# Patient Record
Sex: FEMALE
[De-identification: names, ages, dates, MRNs, and addresses within clinical notes are randomized per-mention and may not be internally consistent; named-entity substitution may affect disease eponyms.]

---

## 2018-06-18 PROBLEM — Z00.00 ENCOUNTER FOR PREVENTIVE HEALTH EXAMINATION: Status: ACTIVE | Noted: 2018-06-18

## 2018-07-12 ENCOUNTER — APPOINTMENT (OUTPATIENT)
Dept: INTERNAL MEDICINE | Facility: CLINIC | Age: 48
End: 2018-07-12

## 2024-12-01 ENCOUNTER — NON-APPOINTMENT (OUTPATIENT)
Age: 54
End: 2024-12-01

## 2024-12-03 ENCOUNTER — NON-APPOINTMENT (OUTPATIENT)
Age: 54
End: 2024-12-03

## 2025-03-25 ENCOUNTER — EMERGENCY (EMERGENCY)
Facility: HOSPITAL | Age: 55
LOS: 1 days | Discharge: HOME | End: 2025-03-25
Attending: EMERGENCY MEDICINE | Admitting: EMERGENCY MEDICINE
Payer: COMMERCIAL

## 2025-03-25 VITALS
DIASTOLIC BLOOD PRESSURE: 76 MMHG | TEMPERATURE: 99 F | OXYGEN SATURATION: 98 % | HEART RATE: 81 BPM | WEIGHT: 115.08 LBS | HEIGHT: 61 IN | SYSTOLIC BLOOD PRESSURE: 108 MMHG | RESPIRATION RATE: 18 BRPM

## 2025-03-25 RX ORDER — METHYLPREDNISOLONE ACETATE 80 MG/ML
1 INJECTION, SUSPENSION INTRA-ARTICULAR; INTRALESIONAL; INTRAMUSCULAR; SOFT TISSUE
Qty: 1 | Refills: 0
Start: 2025-03-25

## 2025-03-25 RX ORDER — ACETAMINOPHEN 500 MG/5ML
975 LIQUID (ML) ORAL ONCE
Refills: 0 | Status: COMPLETED | OUTPATIENT
Start: 2025-03-25 | End: 2025-03-25

## 2025-03-25 RX ORDER — IBUPROFEN 200 MG
400 TABLET ORAL ONCE
Refills: 0 | Status: COMPLETED | OUTPATIENT
Start: 2025-03-25 | End: 2025-03-25

## 2025-03-25 RX ADMIN — Medication 400 MILLIGRAM(S): at 13:29

## 2025-03-25 RX ADMIN — Medication 975 MILLIGRAM(S): at 13:29

## 2025-03-25 NOTE — ED ADULT NURSE NOTE - NS ED NURSE RECORD ANOTHER HT AND WT
Complications of Deep Vein Thrombosis   Deep vein thrombosis (DVT) is a condition involving the formation of a blood clot or thrombus in a deep vein. One may develop in a large vein deep inside the leg, arm, or other part of the body. Complications from deep vein thrombosis can be very serious. They can include pulmonary embolism (PE), chronic venous insufficiency, and post-thrombotic syndrome.    You may hear healthcare providers use the term venous thromboembolism (VTE) to describe DVT and PE. They use the term VTE because the two conditions are very closely related. And, because their prevention and treatment are closely related.   Pulmonary embolism   Pulmonary embolism (PE) happens when part of the clot, called an embolus, separates from the vein. It travels to the lungs and cuts off the flow of blood. A PE may develop quickly. It's a medical emergency and may cause death.          Call 911  Call 911 if you have symptoms of a blood clot in the lungs. Symptoms may include:   Chest pain  Trouble breathing or sudden shortness of breath  Coughing (may cough up blood)  Fainting  Fast heartbeat  Sweating  You may have bleeding if you take medicine to help prevent blood clots. Call 911 if you have heavy or uncontrolled bleeding.   When to call your healthcare provider  Call your healthcare provider if you have symptoms of a blood clot. The symptoms include:   Swelling  Pain  Redness in your leg, arm, or other area  Call your healthcare provider if you have signs or symptoms of bleeding, like blood in the urine, bleeding with bowel movements, or bleeding from the nose, gums, a cut, or vagina.   Chronic venous insufficiency and post-thrombotic syndrome   Two other complications of deep vein thrombosis are chronic venous insufficiency and post-thrombotic syndrome.   Chronic venous insufficiency  may happen following deep vein thrombosis of a leg vein. It means that a vein no longer works as well. It's a long-term  condition where blood stays in the vein instead of flowing back to the heart. Pain and swelling in the leg are common symptoms.   Post-thrombotic syndrome may also happen following deep vein thrombosis of a leg vein. It's a long-term problem with pain, swelling, and redness. Ulcers and sores can also happen if the condition is not treated early. These complications and associated symptoms may make it hard to walk and take part in daily activities.   [employer] last reviewed this educational content on 10/1/2019    2732-6346 [employer]. All rights reserved. This information is not intended as a substitute for professional medical care. Always follow your healthcare professional's instructions.         Yes

## 2025-03-25 NOTE — ED PROVIDER NOTE - NSFOLLOWUPINSTRUCTIONS_ED_ALL_ED_FT
-- You should update your primary care physician on your Emergency Department visit and follow up with them.  If you do not have a physician or have difficulty following up, please call: [phone] to obtain a [facility] doctor or specialist who can provide follow up.    -- Return to the ER for worsening or persistent symptoms, and/or ANY NEW OR CONCERNING SYMPTOMS.    -- Take ibuprofen 400 mg every 6 hours with food, as needed for pain    -- Take tylenol 650 mg every 4 hours, as needed for pain

## 2025-03-25 NOTE — ED PROVIDER NOTE - OBJECTIVE STATEMENT
Patient states that a couple of days ago she bent down to  something and even before she picked it up she started to have low back pain initially was mild.  Now over the past couple of days she has been having to take ibuprofen every 4-6 hours.  Patient denies any radiation down the legs.  Patient has no medical problems.  Patient denies any bowel or bladder dysfunction.

## 2025-03-25 NOTE — ED ADULT NURSE NOTE - OBJECTIVE STATEMENT
54 year old Male comes to the ER with lower back pain that's started a few days ago after bending time. Since pain is intermittent and requires ibuprofen for pain every 6 hours. Patient is able to ambulate with steady gait. A&Ox4, breathing spontaneous and unlabored, palpable pulses. Bed locked and in lowest position for safety.

## 2025-03-25 NOTE — ED PROVIDER NOTE - PHYSICAL EXAMINATION
Gen: alert, NAD  HEENT:  NC/AT, PERR  CV:  well perfused  Pulm:  normal RR, breathing comfortably  Abd: s/nt/nd  MSK: moving all extremities, no spinal ttp  Neuro:  non-focal  Skin:  visualized areas intact  Psych: AOx3

## 2025-03-25 NOTE — ED PROVIDER NOTE - PATIENT PORTAL LINK FT
You can access the FollowMyHealth Patient Portal offered by [facility] by registering at the following website: [email or web address]. By joining [facility]’s FollowMyHealth portal, you will also be able to view your health information using other applications (apps) compatible with our system.